# Patient Record
Sex: FEMALE | Race: WHITE | Employment: OTHER | URBAN - METROPOLITAN AREA
[De-identification: names, ages, dates, MRNs, and addresses within clinical notes are randomized per-mention and may not be internally consistent; named-entity substitution may affect disease eponyms.]

---

## 2017-11-17 ENCOUNTER — HOSPITAL ENCOUNTER (OUTPATIENT)
Dept: MRI IMAGING | Age: 72
Discharge: HOME | End: 2017-11-17
Payer: MEDICARE

## 2017-11-17 DIAGNOSIS — R52 PAIN: ICD-10-CM

## 2017-11-17 PROCEDURE — 72148 MRI LUMBAR SPINE W/O DYE: CPT

## 2018-06-04 ENCOUNTER — OFFICE VISIT (OUTPATIENT)
Dept: GERIATRIC MEDICINE | Age: 73
End: 2018-06-04
Payer: MEDICARE

## 2018-06-04 DIAGNOSIS — M24.569 CONTRACTURE OF JOINT OF LOWER LEG: Primary | ICD-10-CM

## 2018-06-04 PROCEDURE — 1123F ACP DISCUSS/DSCN MKR DOCD: CPT | Performed by: NURSE PRACTITIONER

## 2018-06-04 PROCEDURE — 3017F COLORECTAL CA SCREEN DOC REV: CPT | Performed by: NURSE PRACTITIONER

## 2018-06-04 PROCEDURE — 99309 SBSQ NF CARE MODERATE MDM 30: CPT | Performed by: NURSE PRACTITIONER

## 2018-06-05 VITALS
DIASTOLIC BLOOD PRESSURE: 67 MMHG | HEART RATE: 99 BPM | SYSTOLIC BLOOD PRESSURE: 97 MMHG | WEIGHT: 94 LBS | OXYGEN SATURATION: 98 % | RESPIRATION RATE: 18 BRPM | TEMPERATURE: 98.5 F | BODY MASS INDEX: 16.65 KG/M2

## 2018-06-05 RX ORDER — MULTIVIT WITH MINERALS/LUTEIN
1 TABLET ORAL DAILY
COMMUNITY

## 2018-06-05 RX ORDER — MIRTAZAPINE 15 MG/1
7.5 TABLET, FILM COATED ORAL NIGHTLY
COMMUNITY

## 2018-06-05 RX ORDER — CYANOCOBALAMIN (VITAMIN B-12) 500 MCG
1 LOZENGE ORAL EVERY MORNING
COMMUNITY

## 2018-06-05 RX ORDER — LEVOTHYROXINE SODIUM 0.05 MG/1
50 TABLET ORAL DAILY
COMMUNITY

## 2018-06-08 ENCOUNTER — OFFICE VISIT (OUTPATIENT)
Dept: GERIATRIC MEDICINE | Age: 73
End: 2018-06-08
Payer: MEDICARE

## 2018-06-08 DIAGNOSIS — M25.551 RIGHT HIP PAIN: Primary | ICD-10-CM

## 2018-06-08 DIAGNOSIS — K59.00 CONSTIPATION, UNSPECIFIED CONSTIPATION TYPE: ICD-10-CM

## 2018-06-08 DIAGNOSIS — I10 ESSENTIAL HYPERTENSION: ICD-10-CM

## 2018-06-08 DIAGNOSIS — M15.9 PRIMARY OSTEOARTHRITIS INVOLVING MULTIPLE JOINTS: ICD-10-CM

## 2018-06-08 PROCEDURE — 1123F ACP DISCUSS/DSCN MKR DOCD: CPT | Performed by: INTERNAL MEDICINE

## 2018-06-08 PROCEDURE — 99305 1ST NF CARE MODERATE MDM 35: CPT | Performed by: INTERNAL MEDICINE

## 2018-06-08 PROCEDURE — 3017F COLORECTAL CA SCREEN DOC REV: CPT | Performed by: INTERNAL MEDICINE

## 2018-06-08 PROCEDURE — 1101F PT FALLS ASSESS-DOCD LE1/YR: CPT | Performed by: INTERNAL MEDICINE

## 2018-06-14 DIAGNOSIS — R52 PAIN: Primary | ICD-10-CM

## 2018-06-14 RX ORDER — OXYCODONE HYDROCHLORIDE AND ACETAMINOPHEN 5; 325 MG/1; MG/1
1 TABLET ORAL EVERY 6 HOURS PRN
Qty: 21 TABLET | Refills: 0 | Status: SHIPPED | OUTPATIENT
Start: 2018-06-14 | End: 2018-06-21

## 2018-06-19 ENCOUNTER — OFFICE VISIT (OUTPATIENT)
Dept: GERIATRIC MEDICINE | Age: 73
End: 2018-06-19
Payer: MEDICARE

## 2018-06-19 DIAGNOSIS — G89.29 CHRONIC BILATERAL LOW BACK PAIN WITH BILATERAL SCIATICA: ICD-10-CM

## 2018-06-19 DIAGNOSIS — Z86.018 STATUS POST RESECTION OF MENINGIOMA: ICD-10-CM

## 2018-06-19 DIAGNOSIS — M54.41 CHRONIC BILATERAL LOW BACK PAIN WITH BILATERAL SCIATICA: ICD-10-CM

## 2018-06-19 DIAGNOSIS — S72.001D CLOSED FRACTURE OF RIGHT HIP REQUIRING OPERATIVE REPAIR WITH ROUTINE HEALING, SUBSEQUENT ENCOUNTER: Primary | ICD-10-CM

## 2018-06-19 DIAGNOSIS — Z98.890 STATUS POST RESECTION OF MENINGIOMA: ICD-10-CM

## 2018-06-19 DIAGNOSIS — E03.9 HYPOTHYROIDISM, UNSPECIFIED TYPE: ICD-10-CM

## 2018-06-19 DIAGNOSIS — I10 ESSENTIAL HYPERTENSION: ICD-10-CM

## 2018-06-19 DIAGNOSIS — M54.42 CHRONIC BILATERAL LOW BACK PAIN WITH BILATERAL SCIATICA: ICD-10-CM

## 2018-06-19 PROCEDURE — 99316 NF DSCHRG MGMT 30 MIN+: CPT | Performed by: NURSE PRACTITIONER

## 2018-06-27 NOTE — PROGRESS NOTES
PATIENT: KASSANDRA ELAINE : 1945 DOS: 2018     [facility]    CHIEF COMPLAINT: The patient is being seen for followup of her PT/OT rehabilitation secondary to her right hip fracture. HISTORY OF PRESENT ILLNESS: The patient states she has been using her walker at home for over a year now. Therapy was concerned because the right hip was contracted. The leg was being contracted and pulling up and they thought perhaps a muscle relaxant would help. When I spoke with the patient, she said it has been like that for at least a year, so I doubt that a muscle relaxant will help much. It may ease some of the pain and it may help her relax the leg somewhat, but she said she has been hobbling, almost hopping with a walker because her foot does not touch the ground any longer because of the contraction. She has also been using Percocet with fairly good result. MEDICATION AND ALLERGIES: REVIEWED IN THE NF CHART    FAMILY MEDICAL &  SOCIAL HISTORY: SEE EPIC H & P    Past Medical History:   Diagnosis Date    DJD (degenerative joint disease), lumbar     H. pylori infection     Hypertension     Hypothyroidism     PUD (peptic ulcer disease)        PHYSICAL EXAMINATION: She is resting in bed comfortably. She is awake, alert, slightly forgetful. Leg is contracted. Hip flexor contracture, knee is contracted, and the muscles are very tight and are quite shortened, unable to straighten the leg at all. Heart was regular. Lungs are clear to auscultation. She has no edema. ASSESSMENT AND PLAN: Leg contracture, most likely not able to straight out the leg to improve her therapy. We will add some Zanaflex, the patient is willing to try it.  I told her we could try 2 mg b.i.d. prior to physical therapy since she gets therapy twice a day and see if it would help relax the muscles somewhat, though I did tell her I believe they are already shortened and it should probably not ever be able to straighten out her leg and improve her ambulation. We will follow up p.r.n.           Electronically Signed By: HILLARY Sandra GNP-BC on 06/26/2018 18:33:39  ______________________________  HILLARY Sandra GNP-BC  /[id number]  D: 06/13/2018 19:58:07  T: 06/14/2018 17:04:09    cc: - Fidelina Church 68.

## 2018-06-28 VITALS — HEART RATE: 86 BPM | DIASTOLIC BLOOD PRESSURE: 88 MMHG | SYSTOLIC BLOOD PRESSURE: 134 MMHG | TEMPERATURE: 97.2 F

## 2018-07-01 PROBLEM — M24.569: Status: ACTIVE | Noted: 2018-07-01

## 2018-07-02 NOTE — PROGRESS NOTES
PATIENT: Helena Hinds : 1945 DOS: 2018     [place] [facility]    Discharge summary. HISTORY OF PRESENT ILLNESS: Ms. Froylan Pendleton was here for PT/OT rehabilitation after she had a right hip fracture with repair. This patient had chronic pain. She sees Dr. Jeimy Bonilla from [facility], who is her pain management specialist. She has been on Percocet for over a year q.6 hour p.r.n. and unfortunately she takes it fairly routinely around-the-clock every 6 hours. Her acute pain has resolved since surgery, but she continues to take the Percocet, she says she has pain in the right thigh and it has been released with the Percocet. For therapy, she has been doing stair walking at least 12 stairs daily. She walks 75 to 100 feet. We did place her on Zanaflex muscle relaxant 2 mg b.i.d. that was to be given prior to therapy because unfortunately when she was ambulating several weeks ago, she continued to keep her right leg bent in a contracted position and the hip flexors had shortened and the right knee was contracted and she was not able to reach her toe well down to the ground and was just barely toe touching because of the flexion of the right leg and with the Zanaflex therapy was able to help straighten her leg out, it was much improved and as noted above, she was walking 100 feet. I did have a conversation at length with the patient regarding her Percocet use. I did explain that recent studies have shown it is not as affective for pain management as other adjunct therapies are that it is highly addictive and she should consider other modalities. She said she would not be considering heat or cold. She states Tylenol did not help. She has never tried Ultram and she refused to try Ultram, also she has not been on Cymbalta or gabapentin and she refused to try even one of those.  I did explain to the patient that we would not send her on more than 15 to 20 tablets of Percocet and she would need to follow up in 3 to 5 days with her pain management physician and I did request that nursing obtain an appointment with her pain management doctor upon discharge and also I suggested the patient that she find out from the  if she has any Percocet at home, so that she does not run out of medications in case she has a physical withdrawal issue and she stated she would follow with that. PAST MEDICAL HISTORY: She has a past medical history of GERD, hypertension, hypothyroidism, scoliosis, right parietal lobe mass, cleft palate. NKA    MEDICATIONS: Include aspirin 81 daily, multivitamin daily, ferrous sulfate 325 mg t.i.d., Remeron 7.5 mg q.h.s., Os-Gordon vitamin D daily, Synthroid 50 mcg daily, vitamin E 400 units daily, Zanaflex 200 mg p.o. b.i.d. At discharge, we will change her Percocet to 1 q.8 hours p.r.n. I encouraged her to use Tylenol, heat or cold. REVIEW OF SYSTEMS: Chronic pain in the right hip despite recent surgery for fracture repair. Denies headache, change in vision, change in hearing. No chills, fevers, or sweats. No chest pain, shortness of breath. No nausea, vomiting. No decrease in appetite. She is not a good eater. She states she has never been a good eater. No dysuria. No numbness or tingling in the legs. PHYSICAL ASSESSMENT: Vital Signs: See Epic note. She is sitting up in the wheelchair in the room. She is in no acute distress. She is awake, alert, somewhat flat. EOMs were intact. Speech is slightly dysarthric from cleft palate, but she is very understandable. Answers questions appropriately, stays on task. Heart: Regular rate and rhythm. Lungs are clear to auscultation. Abdomen is nontender. Overall, she is very thin and frail. Moves all extremities x4. Lower extremities have no significant edema. ASSESSMENT AND PLAN:   1. right hip fracture with repair. 2. chronic pain. 3. HTN  4.  Hypothyroid  5. hx s/p meningioma resection    Greater than 35 minutes was spent on discharge of the patient answering her questions, medication reconciliation, reviewing the chart, collaboration with nursing, discharge planning,  for home. The patient refuses home care. She states her  is her sole caregiver and that he can handle taking care of her and she does not need home healthcare. Follow up with Dr. Virginia Mg, pain management as soon as possible within 3 to 5 days. Follow up with PCP in 1 to 2 weeks.           Electronically Signed By: HILLARY Smith GNP-BC on 06/26/2018 18:15:51  ______________________________  HILLARY Smith GNP-BC  /[id number]  D: 06/19/2018 22:32:45  T: 06/20/2018 08:18:41    cc: - Fidelina Church 68.

## 2018-07-11 PROBLEM — S72.009D CLOSED HIP FRACTURE REQUIRING OPERATIVE REPAIR WITH ROUTINE HEALING: Status: ACTIVE | Noted: 2018-07-11
